# Patient Record
Sex: FEMALE | Race: WHITE | NOT HISPANIC OR LATINO | URBAN - METROPOLITAN AREA
[De-identification: names, ages, dates, MRNs, and addresses within clinical notes are randomized per-mention and may not be internally consistent; named-entity substitution may affect disease eponyms.]

---

## 2022-02-02 ENCOUNTER — OFFICE VISIT (OUTPATIENT)
Dept: URBAN - METROPOLITAN AREA CLINIC 37 | Facility: CLINIC | Age: 77
End: 2022-02-02
Payer: MEDICARE

## 2022-02-02 DIAGNOSIS — H52.13 MYOPIA, BILATERAL: ICD-10-CM

## 2022-02-02 DIAGNOSIS — H43.813 VITREOUS DEGENERATION, BILATERAL: ICD-10-CM

## 2022-02-02 DIAGNOSIS — H02.822 CYSTS OF RIGHT LOWER EYELID: ICD-10-CM

## 2022-02-02 PROCEDURE — 99204 OFFICE O/P NEW MOD 45 MIN: CPT | Performed by: OPTOMETRIST

## 2022-02-02 RX ORDER — DOXYCYCLINE HYCLATE 100 MG/1
100 MG TABLET, COATED ORAL
Qty: 14 | Refills: 0 | Status: ACTIVE
Start: 2022-02-02

## 2022-02-02 RX ORDER — NEOMYCIN SULFATE, POLYMYXIN B SULFATE AND DEXAMETHASONE 3.5; 10000; 1 MG/G; [USP'U]/G; MG/G
OINTMENT OPHTHALMIC
Qty: 3.5 | Refills: 0 | Status: ACTIVE
Start: 2022-02-02

## 2022-02-02 ASSESSMENT — VISUAL ACUITY
OD: 20/20
OS: 20/20

## 2022-02-02 ASSESSMENT — INTRAOCULAR PRESSURE
OS: 8
OD: 9

## 2022-02-02 NOTE — IMPRESSION/PLAN
Impression: Nonexudative age-related macular degeneration: H35.3190.  Plan: recd AREDS 2 vitamins/ RTC for OCT

## 2022-02-02 NOTE — IMPRESSION/PLAN
Impression: Hordeolum internum left upper eyelid: H00.024.  Plan: doxy 100 mg BID PO x 7 days , maxitrol fredo QHS and WCs w baby shampoo BID
RTC 2 weeks

## 2022-03-02 ENCOUNTER — OFFICE VISIT (OUTPATIENT)
Dept: URBAN - METROPOLITAN AREA CLINIC 37 | Facility: CLINIC | Age: 77
End: 2022-03-02
Payer: MEDICARE

## 2022-03-02 DIAGNOSIS — H35.3190 NONEXUDATIVE AGE-RELATED MACULAR DEGENERATION, UNSPECIFIED EYE, STAGE UNSPECIFIED: Primary | ICD-10-CM

## 2022-03-02 DIAGNOSIS — H00.024 HORDEOLUM INTERNUM LEFT UPPER EYELID: ICD-10-CM

## 2022-03-02 DIAGNOSIS — Z96.1 PRESENCE OF INTRAOCULAR LENS: ICD-10-CM

## 2022-03-02 PROCEDURE — 99213 OFFICE O/P EST LOW 20 MIN: CPT | Performed by: OPTOMETRIST

## 2022-03-02 PROCEDURE — 92134 CPTRZ OPH DX IMG PST SGM RTA: CPT | Performed by: OPTOMETRIST

## 2022-03-02 NOTE — IMPRESSION/PLAN
Impression: Nonexudative age-related macular degeneration, unspecified eye, stage unspecified: H35.3190.  Plan: cont w AREDS 2, RTc 6 months for re e apple

## 2022-09-01 ENCOUNTER — OFFICE VISIT (OUTPATIENT)
Dept: URBAN - METROPOLITAN AREA CLINIC 37 | Facility: CLINIC | Age: 77
End: 2022-09-01
Payer: MEDICARE

## 2022-09-01 DIAGNOSIS — H40.013 OPEN ANGLE WITH BORDERLINE FINDINGS, LOW RISK, BILATERAL: ICD-10-CM

## 2022-09-01 DIAGNOSIS — H01.009 UNSPECIFIED BLEPHARITIS UNSPECIFIED EYE, UNSPECIFIED EYELID: ICD-10-CM

## 2022-09-01 DIAGNOSIS — H43.813 VITREOUS DEGENERATION, BILATERAL: ICD-10-CM

## 2022-09-01 DIAGNOSIS — H35.3190 NONEXUDATIVE AGE-RELATED MACULAR DEGENERATION: ICD-10-CM

## 2022-09-01 DIAGNOSIS — H04.123 DRY EYE SYNDROME OF BILATERAL LACRIMAL GLANDS: ICD-10-CM

## 2022-09-01 DIAGNOSIS — Z96.1 PRESENCE OF INTRAOCULAR LENS: Primary | ICD-10-CM

## 2022-09-01 PROCEDURE — 99214 OFFICE O/P EST MOD 30 MIN: CPT | Performed by: OPTOMETRIST

## 2022-09-01 RX ORDER — NEOMYCIN SULFATE, POLYMYXIN B SULFATE AND DEXAMETHASONE 3.5; 10000; 1 MG/G; [USP'U]/G; MG/G
OINTMENT OPHTHALMIC
Qty: 3.5 | Refills: 0 | Status: ACTIVE
Start: 2022-09-01

## 2022-09-01 ASSESSMENT — INTRAOCULAR PRESSURE
OD: 9
OS: 8

## 2022-09-01 NOTE — IMPRESSION/PLAN
Impression: Unspecified blepharitis unspecified eye, unspecified eyelid: H01.009. Plan: maxitrol fredo qhs  and WCs QHS.

## 2022-09-01 NOTE — IMPRESSION/PLAN
Impression: Open angle with borderline findings, low risk, bilateral: H40.013.  Plan: large c/d, monitor IOP and VA

## 2022-09-01 NOTE — IMPRESSION/PLAN
Impression: Nonexudative age-related macular degeneration: H35.3190.  Plan: monitor VA and cont w AREDS 2

## 2022-09-01 NOTE — IMPRESSION/PLAN
Impression: Dry eye syndrome of bilateral lacrimal glands: H04.123.  Plan: recd AFTs in am and at bedtime and PAtaday as needed for itch

## 2023-03-01 ENCOUNTER — OFFICE VISIT (OUTPATIENT)
Dept: URBAN - METROPOLITAN AREA CLINIC 37 | Facility: CLINIC | Age: 78
End: 2023-03-01
Payer: MEDICARE

## 2023-03-01 DIAGNOSIS — H35.3190 NONEXUDATIVE AGE-RELATED MACULAR DEGENERATION, UNSPECIFIED EYE, STAGE UNSPECIFIED: Primary | ICD-10-CM

## 2023-03-01 PROCEDURE — 92134 CPTRZ OPH DX IMG PST SGM RTA: CPT | Performed by: OPTOMETRIST

## 2023-03-01 PROCEDURE — 99213 OFFICE O/P EST LOW 20 MIN: CPT | Performed by: OPTOMETRIST

## 2023-03-01 ASSESSMENT — INTRAOCULAR PRESSURE
OD: 9
OS: 9

## 2023-03-01 NOTE — IMPRESSION/PLAN
Impression: Nonexudative age-related macular degeneration, unspecified eye, stage unspecified: H35.3190. Plan: cont w AREDS 2 , OCT and VA stable.   RTc 6 months for CE

## 2023-10-09 ENCOUNTER — OFFICE VISIT (OUTPATIENT)
Dept: URBAN - METROPOLITAN AREA CLINIC 37 | Facility: CLINIC | Age: 78
End: 2023-10-09
Payer: MEDICARE

## 2023-10-09 DIAGNOSIS — H35.3190 NONEXUDATIVE AGE-RELATED MACULAR DEGENERATION, UNSPECIFIED EYE, STAGE UNSPECIFIED: Primary | ICD-10-CM

## 2023-10-09 DIAGNOSIS — H40.013 OPEN ANGLE WITH BORDERLINE FINDINGS, LOW RISK, BILATERAL: ICD-10-CM

## 2023-10-09 PROCEDURE — 92134 CPTRZ OPH DX IMG PST SGM RTA: CPT | Performed by: OPTOMETRIST

## 2023-10-09 PROCEDURE — 99214 OFFICE O/P EST MOD 30 MIN: CPT | Performed by: OPTOMETRIST

## 2023-10-09 ASSESSMENT — INTRAOCULAR PRESSURE
OS: 9
OD: 8

## 2023-10-09 ASSESSMENT — VISUAL ACUITY
OS: 20/20
OD: 20/20

## 2025-08-20 ENCOUNTER — OFFICE VISIT (OUTPATIENT)
Dept: URBAN - METROPOLITAN AREA CLINIC 32 | Facility: CLINIC | Age: 80
End: 2025-08-20
Payer: MEDICARE

## 2025-08-20 DIAGNOSIS — H40.023 OPEN ANGLE WITH BORDERLINE FINDINGS, HIGH RISK, BILATERAL: Primary | ICD-10-CM

## 2025-08-20 DIAGNOSIS — H04.123 DRY EYE SYNDROME OF BILATERAL LACRIMAL GLANDS: ICD-10-CM

## 2025-08-20 PROCEDURE — 76514 ECHO EXAM OF EYE THICKNESS: CPT

## 2025-08-20 PROCEDURE — 92133 CPTRZD OPH DX IMG PST SGM ON: CPT

## 2025-08-20 PROCEDURE — 99214 OFFICE O/P EST MOD 30 MIN: CPT

## 2025-08-20 PROCEDURE — 92083 EXTENDED VISUAL FIELD XM: CPT

## 2025-08-20 RX ORDER — CYCLOSPORINE 0.5 MG/ML
0.05 % EMULSION OPHTHALMIC
Qty: 180 | Refills: 4 | Status: ACTIVE
Start: 2025-08-20

## 2025-08-20 ASSESSMENT — INTRAOCULAR PRESSURE
OD: 10
OS: 11